# Patient Record
Sex: FEMALE | Race: WHITE | Employment: UNEMPLOYED | URBAN - METROPOLITAN AREA
[De-identification: names, ages, dates, MRNs, and addresses within clinical notes are randomized per-mention and may not be internally consistent; named-entity substitution may affect disease eponyms.]

---

## 2021-05-03 ENCOUNTER — OFFICE VISIT (OUTPATIENT)
Dept: OBGYN CLINIC | Facility: CLINIC | Age: 46
End: 2021-05-03
Payer: COMMERCIAL

## 2021-05-03 VITALS
BODY MASS INDEX: 46.7 KG/M2 | DIASTOLIC BLOOD PRESSURE: 70 MMHG | SYSTOLIC BLOOD PRESSURE: 125 MMHG | HEIGHT: 62 IN | WEIGHT: 253.8 LBS

## 2021-05-03 DIAGNOSIS — R10.2 PELVIC PAIN: Primary | ICD-10-CM

## 2021-05-03 PROCEDURE — 99203 OFFICE O/P NEW LOW 30 MIN: CPT | Performed by: OBSTETRICS & GYNECOLOGY

## 2021-05-03 RX ORDER — PEN NEEDLE, DIABETIC 32GX 5/32"
NEEDLE, DISPOSABLE MISCELLANEOUS DAILY
COMMUNITY
Start: 2013-11-12

## 2021-05-03 RX ORDER — PALIPERIDONE 9 MG/1
9 TABLET, EXTENDED RELEASE ORAL DAILY
COMMUNITY
Start: 2021-04-12

## 2021-05-03 RX ORDER — DULAGLUTIDE 1.5 MG/.5ML
INJECTION, SOLUTION SUBCUTANEOUS
COMMUNITY
Start: 2021-04-15

## 2021-05-03 RX ORDER — ALBUTEROL SULFATE 90 UG/1
1-2 AEROSOL, METERED RESPIRATORY (INHALATION)
COMMUNITY
Start: 2021-02-11

## 2021-05-03 RX ORDER — FENOFIBRATE 48 MG/1
48 TABLET, COATED ORAL DAILY
COMMUNITY
Start: 2021-04-12

## 2021-05-03 RX ORDER — CLONAZEPAM 1 MG/1
TABLET ORAL 2 TIMES DAILY PRN
COMMUNITY
Start: 2012-06-11

## 2021-05-03 RX ORDER — AMLODIPINE BESYLATE AND BENAZEPRIL HYDROCHLORIDE 5; 40 MG/1; MG/1
1 CAPSULE ORAL DAILY
COMMUNITY
Start: 2021-04-08

## 2021-05-03 RX ORDER — ALBUTEROL SULFATE 90 UG/1
AEROSOL, METERED RESPIRATORY (INHALATION)
COMMUNITY
Start: 2013-01-10

## 2021-05-03 RX ORDER — LEVOTHYROXINE SODIUM 0.12 MG/1
TABLET ORAL EVERY MORNING
COMMUNITY
Start: 2021-04-08

## 2021-05-03 RX ORDER — EMPAGLIFLOZIN AND METFORMIN HYDROCHLORIDE 5; 1000 MG/1; MG/1
1 TABLET ORAL 2 TIMES DAILY
COMMUNITY
Start: 2021-04-12

## 2021-05-03 NOTE — PROGRESS NOTES
Assessment/Plan:    Pelvic pain  -     US pelvis complete w transvaginal; Future       Pelvic ultrasound ordered to evaluate uterus and ovaries  Cultures declined today  Patient will return for annual exam/pap  Subjective:      Patient ID: Sara Webber is a 39 y o  female  Trisha Fountain presents as a new patient  She has not had a routine gyn exam in probably 6 years  Tues - Thursday was having bad pinching pelvic pain L> -   That came in waves, then got her menstrual cycle on 2021  Since that time has felt pelvic fullness  History ovarian cystectomy (grapefruit size) around age 18  Reports menstrual cycles usually once monthly, but sometimes will go several weeks late  Her cycles last 4-5 days with the 2nd day being heavy  She is sexually active and in a monogamous relationship  She is using withdrawal for contraception  She declines STD testing today  She has no vaginal discharge  She has no dyspareunia  She has no issues emptying her bladder, no dysuria, no changes or concerns with bowel habits  History of C/S x 2 (34 and 37wks), then SAB x 4      The following portions of the patient's history were reviewed and updated as appropriate: allergies, current medications, past family history, past medical history, past social history, past surgical history and problem list     Past Medical History:   Diagnosis Date    Asthma     Diabetes mellitus ([facility] [address] )     Hypertension     Miscarriage     Varicella      Past Surgical History:   Procedure Laterality Date     SECTION      HERNIA REPAIR      OVARIAN CYST REMOVAL         Current Outpatient Medications:     albuterol (Proventil HFA) 90 mcg/act inhaler, Inhale, Disp: , Rfl:     albuterol (PROVENTIL HFA,VENTOLIN HFA) 90 mcg/act inhaler, Inhale 1-2 puffs, Disp: , Rfl:     amLODIPine-benazepril (LOTREL) 5-40 MG per capsule, Take 1 capsule by mouth daily, Disp: , Rfl:     Olimpia Microlet Lancets lancets, Use, Disp: , Rfl:   clonazePAM (KlonoPIN) 1 mg tablet, Take by mouth 2 (two) times a day as needed, Disp: , Rfl:     Dexilant 60 MG capsule, Take 1 capsule by mouth daily, Disp: , Rfl:     fenofibrate (TRICOR) 48 mg tablet, Take 48 mg by mouth daily, Disp: , Rfl:     Glucose Blood (HARJEET CONTOUR TEST VI), Test 2 (two) times a day, Disp: , Rfl:     insulin detemir (LEVEMIR FLEXTOUCH) 100 Units/mL injection pen, Inject under the skin, Disp: , Rfl:     Insulin Pen Needle (Leader Unifine Pentips) 32G X 4 MM MISC, Use daily, Disp: , Rfl:     levothyroxine 125 mcg tablet, Take by mouth every morning, Disp: , Rfl:     paliperidone (INVEGA) 9 MG 24 hr tablet, Take 9 mg by mouth daily, Disp: , Rfl:     Synjardy 5-1000 MG TABS, Take 1 tablet by mouth 2 (two) times a day, Disp: , Rfl:     Trulicity 1 5 DZ/3 4DI SOPN, INJECT WEEKELY, Disp: , Rfl:     Wixela Inhub 250-50 MCG/DOSE inhaler, , Disp: , Rfl:     Review of Systems   Gastrointestinal: Negative for constipation and diarrhea  Genitourinary: Positive for pelvic pain  Negative for dyspareunia, dysuria, menstrual problem, vaginal bleeding, vaginal discharge and vaginal pain  Objective:      /70 (BP Location: Left arm, Patient Position: Sitting)   Ht 5' 2" (1 575 m)   Wt 115 kg (253 lb 12 8 oz)   LMP 04/28/2021 (Exact Date)   BMI 46 42 kg/m²      Physical Exam  Vitals signs reviewed  Constitutional:       Appearance: Normal appearance  She is obese  She is not ill-appearing  Abdominal:      Palpations: Abdomen is soft  Genitourinary:     General: Normal vulva  Labia:         Right: No rash or lesion  Left: No rash or lesion  Vagina: Normal       Cervix: No cervical motion tenderness  Uterus: Tender  Adnexa:         Right: No tenderness  Left: Tenderness present  Comments: Difficult to assess uterine size/adnexa due to body habitus  + tenderness present    Neurological:      General: No focal deficit present  Mental Status: She is alert and oriented to person, place, and time     Psychiatric:         Mood and Affect: Mood normal          Behavior: Behavior normal

## 2021-05-07 ENCOUNTER — HOSPITAL ENCOUNTER (OUTPATIENT)
Dept: RADIOLOGY | Facility: MEDICAL CENTER | Age: 46
Discharge: HOME | End: 2021-05-07
Payer: COMMERCIAL

## 2021-05-07 DIAGNOSIS — R10.2 PELVIC PAIN: ICD-10-CM

## 2021-05-07 PROCEDURE — 76856 US EXAM PELVIC COMPLETE: CPT

## 2021-05-07 PROCEDURE — 76830 TRANSVAGINAL US NON-OB: CPT

## 2021-05-18 ENCOUNTER — TELEPHONE (OUTPATIENT)
Dept: OBGYN CLINIC | Facility: CLINIC | Age: 46
End: 2021-05-18

## 2021-05-18 NOTE — TELEPHONE ENCOUNTER
----- Message from Luis Angel Bone MD sent at 5/18/2021  9:25 AM EDT -----  Ultrasound is normal - please see if she has had any continued pain?

## 2021-05-19 NOTE — TELEPHONE ENCOUNTER
Pt contacted and informed/asked as directed  Pt was grateful for the information and stated she hasn't had pain for 5 day  Pt furthermore stated she is following up with her pcp

## 2021-05-24 ENCOUNTER — TELEPHONE (OUTPATIENT)
Dept: FAMILY MEDICINE CLINIC | Facility: CLINIC | Age: 46
End: 2021-05-24

## 2021-07-07 ENCOUNTER — TELEPHONE (OUTPATIENT)
Dept: FAMILY MEDICINE CLINIC | Facility: CLINIC | Age: 46
End: 2021-07-07

## 2021-07-07 NOTE — TELEPHONE ENCOUNTER
Pt due for dm check and pe -lmtrc (doesn't look like patient has been seen by Dr Marguarite Mohs?) tc/cma

## 2021-07-09 NOTE — TELEPHONE ENCOUNTER
Checked [employer], patient was last seen here by Dr Prabhu Fontanez in 2014  [address] to see if patient has new PCP or if she needs to re establish in this office

## 2025-06-04 ENCOUNTER — OFFICE VISIT (OUTPATIENT)
Dept: GASTROENTEROLOGY | Facility: AMBULARY SURGERY CENTER | Age: 50
End: 2025-06-04
Payer: COMMERCIAL

## 2025-06-04 VITALS
DIASTOLIC BLOOD PRESSURE: 68 MMHG | BODY MASS INDEX: 53.4 KG/M2 | SYSTOLIC BLOOD PRESSURE: 126 MMHG | OXYGEN SATURATION: 97 % | WEIGHT: 290.2 LBS | HEIGHT: 62 IN | HEART RATE: 83 BPM

## 2025-06-04 DIAGNOSIS — K59.09 CHRONIC CONSTIPATION: ICD-10-CM

## 2025-06-04 DIAGNOSIS — R10.84 GENERALIZED ABDOMINAL PAIN: ICD-10-CM

## 2025-06-04 DIAGNOSIS — Z12.11 COLON CANCER SCREENING: ICD-10-CM

## 2025-06-04 DIAGNOSIS — K62.5 RECTAL BLEEDING: Primary | ICD-10-CM

## 2025-06-04 DIAGNOSIS — R13.19 ESOPHAGEAL DYSPHAGIA: ICD-10-CM

## 2025-06-04 DIAGNOSIS — K21.9 GASTROESOPHAGEAL REFLUX DISEASE, UNSPECIFIED WHETHER ESOPHAGITIS PRESENT: ICD-10-CM

## 2025-06-04 DIAGNOSIS — K43.9 ABDOMINAL WALL HERNIA: ICD-10-CM

## 2025-06-04 DIAGNOSIS — K76.0 FATTY LIVER: ICD-10-CM

## 2025-06-04 PROCEDURE — 99204 OFFICE O/P NEW MOD 45 MIN: CPT | Performed by: PHYSICIAN ASSISTANT

## 2025-06-04 RX ORDER — PIOGLITAZONE 15 MG/1
15 TABLET ORAL DAILY
COMMUNITY

## 2025-06-04 RX ORDER — OMEPRAZOLE 40 MG/1
40 CAPSULE, DELAYED RELEASE ORAL DAILY
COMMUNITY

## 2025-06-04 RX ORDER — SODIUM CHLORIDE, SODIUM LACTATE, POTASSIUM CHLORIDE, CALCIUM CHLORIDE 600; 310; 30; 20 MG/100ML; MG/100ML; MG/100ML; MG/100ML
125 INJECTION, SOLUTION INTRAVENOUS CONTINUOUS
Status: CANCELLED | OUTPATIENT
Start: 2025-06-04

## 2025-06-04 RX ORDER — LINACLOTIDE 145 UG/1
145 CAPSULE, GELATIN COATED ORAL
Qty: 30 CAPSULE | Refills: 5 | Status: SHIPPED | OUTPATIENT
Start: 2025-06-04

## 2025-06-04 NOTE — PROGRESS NOTES
Name: Jovita Samayoa      : 1975      MRN: [id number]  Encounter Provider: Litzy Rhoades PA-C  Encounter Date: 2025   Encounter department: [facility]  :  Assessment & Plan  Rectal bleeding  Chronic constipation  Generalized abdominal pain  -reports issues with constipation for 1-2 years, has bloating and abdominal pain, moves bowels daily but stools are hard at times and has to strain to go. Has BRBPR with wiping and often gets rectal pain as well but not while passing a stool. Suspect IBS-C but must rule out other causes, colorectal lesions, uncontrolled thyroid disease, celiac disease, etc.   -recommend colonoscopy at this time to evaluate as she has never had one  -discussed procedure, risks including perforation, infection, and bleeding, missing a diagnosis, and benefits in detail with patient   -to be done in hospital setting due to BMI  -clenpiq prep ordered  -on ozempic, Actos, and metformin for diabetes as well as oral iron, no anticoagulation  -will fax PCP office for records of blood work which was just done in January, pending this may order more labs to assess TSH and celiac panel.   -recommend starting Linzess 145 mcg daily for constipation symptoms as she has failed fiber, magnesium, and stool softeners over the counter. Discussed medication, how to take, and possible SE in detail   Orders:    sodium picosulfate, magnesium oxide, citric acid (Clenpiq) oral solution; Take 175 mL by mouth 1 (one) time for 1 dose Per office instructions    Colonoscopy; Future    EGD; Future    linaCLOtide (Linzess) 145 MCG CAPS; Take 1 capsule (145 mcg total) by mouth daily before breakfast    Colon cancer screening  -colonoscopy as above, has never had a colonoscopy   Orders:    sodium picosulfate, magnesium oxide, citric acid (Clenpiq) oral solution; Take 175 mL by mouth 1 (one) time for 1 dose Per office instructions    Colonoscopy; Future    Esophageal  dysphagia  -reports issues with swallowing for a few years, feels like food and liquids get stuck, also can be painful, denies regurgitation, reflux well controlled on PPI daily, never had an EGD  -continue PPI daily  -chew food well, alternate solids and liquids  -plan for EGD to further assess  -consider barium swallow pending EGD results.   Orders:    EGD; Future    Gastroesophageal reflux disease, unspecified whether esophagitis present  -well controlled on omeprazole daily   -never had an EGD, will assess for any chronic reflux changes or murrieta's on EGD  -anti-reflux diet and measures  -weight loss recommended        Fatty liver  -fatty liver noted on US in 2023, reports having an elastography with PCP in January and did not have fibrosis, will obtain records, unclear if she has had LFT elevation, will also request labs  -low carb/low fat diet, weight loss, exercise, limiting alcohol use, controlling blood sugar and cholesterol management, on Ozempic currently          Abdominal wall hernia  Large on exam, has worsened over time, no pain, has hx of repair and mesh in this area many years ago at [facility]  -discussed that surgical referral can be considered in the future but would recommend diabetes control and weight loss prior to improve outcomes. Will hold off on referral for now as she is asymptomatic.            History of Present Illness   HPI  Jovita Samayoa is a 49 y.o. female with a history of hypertension, asthma, GERD, diabetes, hypothyroidism, obesity, anxiety who presents for a new patient visit.  Patient has multiple GI complaints.  She is reporting that she has had issues with constipation for over a year.  She does go to the bathroom every day but has to strain significantly to move her bowels.  She also passes bright red blood per rectum with her bowel movements about every other day for the last couple of months.  She sometimes will also experience rectal pain but this is not during a  bowel movement.  She has tried Metamucil, magnesium, and stool softeners without any benefit.  She has associated bloating after eating and epigastric abdominal pain.  She denies any significant nausea or vomiting.  She has a history of reflux and has been on omeprazole for the last year.  This controls her reflux relatively well but sometimes has some breakthrough symptoms.  She reports occasional odynophagia and also sometimes regurgitation of food.  She reports having swallowing issues for the last couple of years and feels that food, liquids, and pills get stuck on the way down.  Eventually they will pass.  She has never had an EGD or colonoscopy in the past.  She is on Ozempic for the last couple of years.  She does not feel that any of her GI symptoms have worsened on this medication.  She had a hernia repair in 2006 with mesh placement but this has recurred and has enlarged significantly but denies any pain in this area.  Has a history of having 2 C-sections.  Denies any excessive alcohol use.  Smokes about 10 cigarettes a day.  Uses Advil about once daily for pain.  She denies any known family history of colon, esophageal, or gastric cancer.      Review of Systems   Constitutional:  Negative for activity change, appetite change, fever and unexpected weight change.   HENT:  Positive for trouble swallowing. Negative for drooling, mouth sores, sore throat and voice change.    Eyes:  Negative for pain, discharge and visual disturbance.   Respiratory:  Negative for cough, choking, chest tightness and shortness of breath.    Cardiovascular:  Negative for chest pain, palpitations and leg swelling.   Gastrointestinal:  Positive for abdominal pain, blood in stool and constipation. Negative for abdominal distention, anal bleeding, diarrhea, nausea, rectal pain and vomiting.   Genitourinary:  Negative for decreased urine volume, difficulty urinating, dysuria, flank pain and urgency.   Musculoskeletal:  Negative for  "arthralgias, back pain, gait problem, myalgias and neck stiffness.   Skin:  Negative for color change, pallor and rash.   Neurological:  Negative for dizziness, syncope and light-headedness.   Hematological:  Negative for adenopathy.   Psychiatric/Behavioral:  Negative for confusion. The patient is not nervous/anxious.           Objective   /68 (BP Location: Left arm, Patient Position: Sitting, Cuff Size: Standard)   Pulse 83   Ht 5' 2\" (1.575 m)   Wt 132 kg (290 lb 3.2 oz)   SpO2 97%   BMI 53.08 kg/m²      Physical Exam  Constitutional:       General: She is not in acute distress.     Appearance: Normal appearance. She is well-developed. She is obese.   HENT:      Head: Normocephalic and atraumatic.      Mouth/Throat:      Mouth: Mucous membranes are moist.     Eyes:      General: No scleral icterus.    Neck:      Trachea: No tracheal deviation.     Cardiovascular:      Rate and Rhythm: Normal rate and regular rhythm.      Heart sounds: Normal heart sounds. No murmur heard.  Pulmonary:      Effort: Pulmonary effort is normal. No respiratory distress.      Breath sounds: Normal breath sounds. No wheezing or rales.   Abdominal:      General: Abdomen is protuberant. Bowel sounds are normal. There is no distension.      Palpations: Abdomen is soft. There is no mass.      Tenderness: There is no abdominal tenderness. There is no guarding or rebound.      Hernia: A hernia is present.      Comments: Large mid abdominal hernia present without tenderness      Musculoskeletal:         General: Normal range of motion.      Cervical back: Normal range of motion and neck supple.     Skin:     General: Skin is warm and dry.      Coloration: Skin is not pale.      Findings: No rash.     Neurological:      Mental Status: She is alert and oriented to person, place, and time. Mental status is at baseline.     Psychiatric:         Mood and Affect: Mood normal.         Behavior: Behavior normal.           "

## 2025-06-04 NOTE — PATIENT INSTRUCTIONS
Scheduled date of EGD/colonoscopy (as of today): 6/13/2025  Physician performing EGD/colonoscopy: Dr. Huffman  Location of EGD/colonoscopy: [facility]  Desired bowel prep reviewed with patient: Clenpiq  Instructions reviewed with patient by: Abena RUIZ  Clearances: N/A ( Diabetic/Iron/Ozempic/Actos/Metformin)

## 2025-06-04 NOTE — ASSESSMENT & PLAN NOTE
-well controlled on omeprazole daily   -never had an EGD, will assess for any chronic reflux changes or murrieta's on EGD  -anti-reflux diet and measures  -weight loss recommended

## 2025-06-09 ENCOUNTER — TELEPHONE (OUTPATIENT)
Age: 50
End: 2025-06-09

## 2025-06-09 DIAGNOSIS — R14.0 ABDOMINAL BLOATING: Primary | ICD-10-CM

## 2025-06-09 DIAGNOSIS — R10.84 GENERALIZED ABDOMINAL PAIN: ICD-10-CM

## 2025-06-09 DIAGNOSIS — K59.09 OTHER CONSTIPATION: ICD-10-CM

## 2025-06-09 DIAGNOSIS — K62.5 RECTAL BLEEDING: ICD-10-CM

## 2025-06-10 NOTE — TELEPHONE ENCOUNTER
Called pt/left msg advising to have additional labs completed.  Advised pt labs are in chart if she uses [facility] labs or she can call the office to have the orders mailed or come into office to .

## 2025-06-13 ENCOUNTER — HOSPITAL ENCOUNTER (OUTPATIENT)
Dept: GASTROENTEROLOGY | Facility: HOSPITAL | Age: 50
Setting detail: OUTPATIENT SURGERY
End: 2025-06-13
Attending: PHYSICIAN ASSISTANT
Payer: COMMERCIAL

## 2025-06-13 ENCOUNTER — ANESTHESIA EVENT (OUTPATIENT)
Dept: GASTROENTEROLOGY | Facility: HOSPITAL | Age: 50
End: 2025-06-13
Payer: COMMERCIAL

## 2025-06-13 ENCOUNTER — ANESTHESIA (OUTPATIENT)
Dept: GASTROENTEROLOGY | Facility: HOSPITAL | Age: 50
End: 2025-06-13
Payer: COMMERCIAL

## 2025-06-13 VITALS
WEIGHT: 293 LBS | SYSTOLIC BLOOD PRESSURE: 134 MMHG | RESPIRATION RATE: 18 BRPM | HEIGHT: 62 IN | OXYGEN SATURATION: 97 % | BODY MASS INDEX: 53.92 KG/M2 | HEART RATE: 79 BPM | DIASTOLIC BLOOD PRESSURE: 70 MMHG | TEMPERATURE: 99.4 F

## 2025-06-13 DIAGNOSIS — R13.19 ESOPHAGEAL DYSPHAGIA: ICD-10-CM

## 2025-06-13 DIAGNOSIS — R10.84 GENERALIZED ABDOMINAL PAIN: ICD-10-CM

## 2025-06-13 DIAGNOSIS — Z12.11 COLON CANCER SCREENING: ICD-10-CM

## 2025-06-13 DIAGNOSIS — K62.5 RECTAL BLEEDING: ICD-10-CM

## 2025-06-13 DIAGNOSIS — K59.09 CHRONIC CONSTIPATION: ICD-10-CM

## 2025-06-13 LAB
EXT PREGNANCY TEST URINE: NEGATIVE
EXT. CONTROL: NORMAL
GLUCOSE SERPL-MCNC: 217 MG/DL (ref 65–140)

## 2025-06-13 PROCEDURE — 81025 URINE PREGNANCY TEST: CPT | Performed by: INTERNAL MEDICINE

## 2025-06-13 PROCEDURE — 82948 REAGENT STRIP/BLOOD GLUCOSE: CPT

## 2025-06-13 PROCEDURE — 88305 TISSUE EXAM BY PATHOLOGIST: CPT | Performed by: PATHOLOGY

## 2025-06-13 RX ORDER — PROPOFOL 10 MG/ML
INJECTION, EMULSION INTRAVENOUS AS NEEDED
Status: DISCONTINUED | OUTPATIENT
Start: 2025-06-13 | End: 2025-06-13

## 2025-06-13 RX ORDER — SODIUM CHLORIDE, SODIUM LACTATE, POTASSIUM CHLORIDE, CALCIUM CHLORIDE 600; 310; 30; 20 MG/100ML; MG/100ML; MG/100ML; MG/100ML
INJECTION, SOLUTION INTRAVENOUS CONTINUOUS PRN
Status: DISCONTINUED | OUTPATIENT
Start: 2025-06-13 | End: 2025-06-13

## 2025-06-13 RX ORDER — LIDOCAINE HYDROCHLORIDE 10 MG/ML
INJECTION, SOLUTION EPIDURAL; INFILTRATION; INTRACAUDAL; PERINEURAL AS NEEDED
Status: DISCONTINUED | OUTPATIENT
Start: 2025-06-13 | End: 2025-06-13

## 2025-06-13 RX ORDER — PROPOFOL 10 MG/ML
INJECTION, EMULSION INTRAVENOUS CONTINUOUS PRN
Status: DISCONTINUED | OUTPATIENT
Start: 2025-06-13 | End: 2025-06-13

## 2025-06-13 RX ORDER — DICYCLOMINE HYDROCHLORIDE 10 MG/1
10 CAPSULE ORAL 3 TIMES DAILY PRN
Qty: 90 CAPSULE | Refills: 3 | Status: SHIPPED | OUTPATIENT
Start: 2025-06-13

## 2025-06-13 RX ORDER — SODIUM CHLORIDE, SODIUM LACTATE, POTASSIUM CHLORIDE, CALCIUM CHLORIDE 600; 310; 30; 20 MG/100ML; MG/100ML; MG/100ML; MG/100ML
125 INJECTION, SOLUTION INTRAVENOUS CONTINUOUS
Status: DISCONTINUED | OUTPATIENT
Start: 2025-06-13 | End: 2025-06-17 | Stop reason: HOSPADM

## 2025-06-13 RX ADMIN — LIDOCAINE HYDROCHLORIDE 50 MG: 10 INJECTION, SOLUTION EPIDURAL; INFILTRATION; INTRACAUDAL at 07:36

## 2025-06-13 RX ADMIN — SODIUM CHLORIDE, SODIUM LACTATE, POTASSIUM CHLORIDE, AND CALCIUM CHLORIDE: .6; .31; .03; .02 INJECTION, SOLUTION INTRAVENOUS at 06:34

## 2025-06-13 RX ADMIN — PROPOFOL 100 MG: 10 INJECTION, EMULSION INTRAVENOUS at 07:36

## 2025-06-13 RX ADMIN — PROPOFOL 100 MG: 10 INJECTION, EMULSION INTRAVENOUS at 07:37

## 2025-06-13 RX ADMIN — PROPOFOL 120 MCG/KG/MIN: 10 INJECTION, EMULSION INTRAVENOUS at 07:37

## 2025-06-13 NOTE — H&P
"History and Physical -  [facility]  Jovita Samayoa 49 y.o. female MRN: [id number]    HPI: Jovita Samayoa is a 49 y.o. year old female who presents with abd pain, GERD, dysphagia, rectal bleeding.       Review of Systems    Historical Information   Past Medical History[1]  Past Surgical History[2]  Social History   Social History     Substance and Sexual Activity   Alcohol Use Yes    Comment: occasionally     Social History     Substance and Sexual Activity   Drug Use Never     Tobacco Use History[3]  Family History[4]    Meds/Allergies     Not in a hospital admission.    Allergies[5]    Objective     BP (!) 193/80   Pulse 87   Temp 98.8 °F (37.1 °C) (Temporal)   Resp 18   Ht 5' 2\" (1.575 m)   Wt 135 kg (298 lb)   LMP 2025 (Exact Date)   SpO2 97%   BMI 54.50 kg/m²       PHYSICAL EXAM    Gen: NAD  CV: RRR  CHEST: Clear  ABD: soft, NT/ND  EXT: no edema  Neuro: AAO      ASSESSMENT/PLAN:  This is a 49 y.o. year old female here for abd pain, GERD, dysphagia, rectal bleeding.     PLAN:   Procedure: egd/colonoscopy           [1]   Past Medical History:  Diagnosis Date    Asthma     Diabetes mellitus (HCC)     Disease of thyroid gland     Hypertension     Miscarriage     Sleep apnea     Varicella    [2]   Past Surgical History:  Procedure Laterality Date     SECTION      HERNIA REPAIR      OVARIAN CYST REMOVAL     [3]   Social History  Tobacco Use   Smoking Status Every Day    Current packs/day: 0.50    Types: Cigarettes   Smokeless Tobacco Never   [4]   Family History  Problem Relation Name Age of Onset    Lung cancer Mother      Cancer Brother      Breast cancer Maternal Grandmother      Breast cancer Paternal Grandmother     [5]   Allergies  Allergen Reactions    Mounjaro [Tirzepatide] Hives and Shortness Of Breath     "

## 2025-06-13 NOTE — ANESTHESIA PREPROCEDURE EVALUATION
Procedure:  COLONOSCOPY  EGD    Relevant Problems   CARDIO   (+) Benign essential hypertension      ENDO   (+) Hypothyroidism   (+) Type 2 diabetes mellitus (HCC)      GI/HEPATIC   (+) Esophageal reflux      NEURO/PSYCH   (+) Anxiety disorder      PULMONARY   (+) Asthma       prep 6/13    Physical Exam    Airway     Mallampati score: III  TM Distance: >3 FB  Neck ROM: full      Cardiovascular  Cardiovascular exam normal    Dental        Pulmonary  Pulmonary exam normal     Neurological      Other Findings  post-pubertal.      Anesthesia Plan  ASA Score- 3     Anesthesia Type- IV sedation with anesthesia with ASA Monitors.         Additional Monitors:     Airway Plan:            Plan Factors-Exercise tolerance (METS): >4 METS.    Chart reviewed. EKG reviewed.  Existing labs reviewed. Patient summary reviewed.    Patient is not a current smoker. Patient not instructed to abstain from smoking on day of procedure.             Induction- intravenous.    Postoperative Plan- Plan for postoperative opioid use.   Monitoring Plan - Monitoring plan - standard ASA monitoring  Post Operative Pain Plan - non-opiod analgesics, plan for postoperative opioid use and multimodal analgesia    Perioperative Resuscitation Plan - Level 1 - Full Code.       Informed Consent- Anesthetic plan and risks discussed with patient and spouse.  I personally reviewed this patient with the CRNA. Discussed and agreed on the Anesthesia Plan with the CRNA..      NPO Status:  Vitals Value Taken Time   Date of last liquid 06/13/25 06/13/25 06:58   Time of last liquid 0300 06/13/25 06:58   Date of last solid 06/11/25 06/13/25 06:58   Time of last solid 2000 06/13/25 06:58

## 2025-06-13 NOTE — ANESTHESIA POSTPROCEDURE EVALUATION
Post-Op Assessment Note    CV Status:  Stable  Pain Score: 0    Pain management: adequate       Mental Status:  Alert and awake   Hydration Status:  Stable   PONV Controlled:  None   Airway Patency:  Patent     Post Op Vitals Reviewed: Yes    No anethesia notable event occurred.    Staff: CRNA   Comments: AAOx3, SV Nonobstructed, VSS          Last Filed PACU Vitals:  Vitals Value Taken Time   Temp 99.4 °F (37.4 °C) 06/13/25 08:03   Pulse 85 06/13/25 08:03   /65 06/13/25 08:03   Resp 18 06/13/25 08:03   SpO2 97 % 06/13/25 08:03       Modified Bakari:     Vitals Value Taken Time   Activity 2 06/13/25 08:03   Respiration 2 06/13/25 08:03   Circulation 2 06/13/25 08:03   Consciousness 1 06/13/25 08:03   Oxygen Saturation 2 06/13/25 08:03     Modified Bakari Score: 9

## 2025-06-17 ENCOUNTER — RESULTS FOLLOW-UP (OUTPATIENT)
Dept: GASTROENTEROLOGY | Facility: AMBULARY SURGERY CENTER | Age: 50
End: 2025-06-17

## 2025-06-17 DIAGNOSIS — D72.828 OTHER ELEVATED WHITE BLOOD CELL (WBC) COUNT: Primary | ICD-10-CM

## 2025-06-18 LAB
BASOPHILS # BLD AUTO: 47 CELLS/UL (ref 0–200)
BASOPHILS NFR BLD AUTO: 0.4 %
EOSINOPHIL # BLD AUTO: 491 CELLS/UL (ref 15–500)
EOSINOPHIL NFR BLD AUTO: 4.2 %
ERYTHROCYTE [DISTWIDTH] IN BLOOD BY AUTOMATED COUNT: 12.7 % (ref 11–15)
HCT VFR BLD AUTO: 39.6 % (ref 35–45)
HGB BLD-MCNC: 13.2 G/DL (ref 11.7–15.5)
IGA SERPL-MCNC: 287 MG/DL (ref 47–310)
LYMPHOCYTES # BLD AUTO: 2293 CELLS/UL (ref 850–3900)
LYMPHOCYTES NFR BLD AUTO: 19.6 %
MCH RBC QN AUTO: 29.7 PG (ref 27–33)
MCHC RBC AUTO-ENTMCNC: 33.3 G/DL (ref 32–36)
MCV RBC AUTO: 89 FL (ref 80–100)
MONOCYTES # BLD AUTO: 749 CELLS/UL (ref 200–950)
MONOCYTES NFR BLD AUTO: 6.4 %
NEUTROPHILS # BLD AUTO: 8120 CELLS/UL (ref 1500–7800)
NEUTROPHILS NFR BLD AUTO: 69.4 %
PLATELET # BLD AUTO: 347 THOUSAND/UL (ref 140–400)
PMV BLD REES-ECKER: 9.8 FL (ref 7.5–12.5)
RBC # BLD AUTO: 4.45 MILLION/UL (ref 3.8–5.1)
TSH BII SER-ACNC: <1 IU/L
TTG IGA SER-ACNC: <1 U/ML
WBC # BLD AUTO: 11.7 THOUSAND/UL (ref 3.8–10.8)

## 2025-06-18 PROCEDURE — 88305 TISSUE EXAM BY PATHOLOGIST: CPT | Performed by: PATHOLOGY

## 2025-06-23 ENCOUNTER — RESULTS FOLLOW-UP (OUTPATIENT)
Age: 50
End: 2025-06-23

## 2025-06-24 DIAGNOSIS — K21.9 GASTROESOPHAGEAL REFLUX DISEASE WITHOUT ESOPHAGITIS: Primary | ICD-10-CM
